# Patient Record
Sex: MALE | Race: WHITE | NOT HISPANIC OR LATINO
[De-identification: names, ages, dates, MRNs, and addresses within clinical notes are randomized per-mention and may not be internally consistent; named-entity substitution may affect disease eponyms.]

---

## 2018-06-25 PROBLEM — Z00.00 ENCOUNTER FOR PREVENTIVE HEALTH EXAMINATION: Status: ACTIVE | Noted: 2018-06-25

## 2018-07-25 ENCOUNTER — APPOINTMENT (OUTPATIENT)
Dept: OTOLARYNGOLOGY | Facility: CLINIC | Age: 23
End: 2018-07-25

## 2021-02-16 ENCOUNTER — OUTPATIENT (OUTPATIENT)
Dept: OUTPATIENT SERVICES | Facility: HOSPITAL | Age: 26
LOS: 1 days | End: 2021-02-16
Payer: COMMERCIAL

## 2021-02-16 VITALS
WEIGHT: 171.96 LBS | HEIGHT: 74 IN | OXYGEN SATURATION: 97 % | SYSTOLIC BLOOD PRESSURE: 125 MMHG | RESPIRATION RATE: 18 BRPM | TEMPERATURE: 97 F | HEART RATE: 81 BPM | DIASTOLIC BLOOD PRESSURE: 68 MMHG

## 2021-02-16 DIAGNOSIS — Z98.890 OTHER SPECIFIED POSTPROCEDURAL STATES: Chronic | ICD-10-CM

## 2021-02-16 DIAGNOSIS — J32.0 CHRONIC MAXILLARY SINUSITIS: ICD-10-CM

## 2021-02-16 DIAGNOSIS — J34.3 HYPERTROPHY OF NASAL TURBINATES: ICD-10-CM

## 2021-02-16 DIAGNOSIS — J34.2 DEVIATED NASAL SEPTUM: ICD-10-CM

## 2021-02-16 DIAGNOSIS — Z01.818 ENCOUNTER FOR OTHER PREPROCEDURAL EXAMINATION: ICD-10-CM

## 2021-02-16 LAB
ANION GAP SERPL CALC-SCNC: 11 MMOL/L — SIGNIFICANT CHANGE UP (ref 5–17)
BUN SERPL-MCNC: 17 MG/DL — SIGNIFICANT CHANGE UP (ref 7–23)
CALCIUM SERPL-MCNC: 9.6 MG/DL — SIGNIFICANT CHANGE UP (ref 8.4–10.5)
CHLORIDE SERPL-SCNC: 102 MMOL/L — SIGNIFICANT CHANGE UP (ref 96–108)
CO2 SERPL-SCNC: 25 MMOL/L — SIGNIFICANT CHANGE UP (ref 22–31)
CREAT SERPL-MCNC: 1.05 MG/DL — SIGNIFICANT CHANGE UP (ref 0.5–1.3)
GLUCOSE SERPL-MCNC: 89 MG/DL — SIGNIFICANT CHANGE UP (ref 70–99)
HCT VFR BLD CALC: 44.7 % — SIGNIFICANT CHANGE UP (ref 39–50)
HGB BLD-MCNC: 14.9 G/DL — SIGNIFICANT CHANGE UP (ref 13–17)
MCHC RBC-ENTMCNC: 29.1 PG — SIGNIFICANT CHANGE UP (ref 27–34)
MCHC RBC-ENTMCNC: 33.3 GM/DL — SIGNIFICANT CHANGE UP (ref 32–36)
MCV RBC AUTO: 87.3 FL — SIGNIFICANT CHANGE UP (ref 80–100)
NRBC # BLD: 0 /100 WBCS — SIGNIFICANT CHANGE UP (ref 0–0)
PLATELET # BLD AUTO: 204 K/UL — SIGNIFICANT CHANGE UP (ref 150–400)
POTASSIUM SERPL-MCNC: 4.1 MMOL/L — SIGNIFICANT CHANGE UP (ref 3.5–5.3)
POTASSIUM SERPL-SCNC: 4.1 MMOL/L — SIGNIFICANT CHANGE UP (ref 3.5–5.3)
RBC # BLD: 5.12 M/UL — SIGNIFICANT CHANGE UP (ref 4.2–5.8)
RBC # FLD: 11.8 % — SIGNIFICANT CHANGE UP (ref 10.3–14.5)
SODIUM SERPL-SCNC: 138 MMOL/L — SIGNIFICANT CHANGE UP (ref 135–145)
WBC # BLD: 8.26 K/UL — SIGNIFICANT CHANGE UP (ref 3.8–10.5)
WBC # FLD AUTO: 8.26 K/UL — SIGNIFICANT CHANGE UP (ref 3.8–10.5)

## 2021-02-16 PROCEDURE — G0463: CPT

## 2021-02-16 PROCEDURE — 85027 COMPLETE CBC AUTOMATED: CPT

## 2021-02-16 PROCEDURE — 80048 BASIC METABOLIC PNL TOTAL CA: CPT

## 2021-02-16 RX ORDER — SODIUM CHLORIDE 9 MG/ML
3 INJECTION INTRAMUSCULAR; INTRAVENOUS; SUBCUTANEOUS EVERY 8 HOURS
Refills: 0 | Status: DISCONTINUED | OUTPATIENT
Start: 2021-03-02 | End: 2021-03-16

## 2021-02-16 RX ORDER — FLUTICASONE PROPIONATE 220 MCG
1 AEROSOL WITH ADAPTER (GRAM) INHALATION
Qty: 0 | Refills: 0 | DISCHARGE

## 2021-02-16 RX ORDER — LIDOCAINE HCL 20 MG/ML
0.2 VIAL (ML) INJECTION ONCE
Refills: 0 | Status: DISCONTINUED | OUTPATIENT
Start: 2021-03-02 | End: 2021-03-16

## 2021-02-16 NOTE — H&P PST ADULT - HISTORY OF PRESENT ILLNESS
25 year old male with no significant PMH with longstanding Chronic Sinusitis. He endorses that he is currently on Cefdinir for the past 3 weeks and was instructed to continue with antibiotic through to surgery.  he presents to PAT today for screening prior to Nasal/Sinus Endoscopy W/Maxillary Antrostomy, Bilateral Submucous Resection Inferior Turbinates, Septoplasty on 3/2/2021.    COVID-19 pre-op screening on 2/28 at [facility]

## 2021-02-16 NOTE — H&P PST ADULT - NSANTHOSAYNRD_GEN_A_CORE
No. KAR screening performed.  STOP BANG Legend: 0-2 = LOW Risk; 3-4 = INTERMEDIATE Risk; 5-8 = HIGH Risk

## 2021-02-16 NOTE — H&P PST ADULT - NSICDXPROBLEM_GEN_ALL_CORE_FT
PROBLEM DIAGNOSES  Problem: Chronic maxillary sinusitis  Assessment and Plan: Nasal/Sinus Endoscopy w/maxillary Antrostomy  Bilateral submucous Resection inferior Turbinates  Septoplasty

## 2021-02-23 ENCOUNTER — APPOINTMENT (OUTPATIENT)
Dept: CT IMAGING | Facility: IMAGING CENTER | Age: 26
End: 2021-02-23

## 2021-02-23 ENCOUNTER — OUTPATIENT (OUTPATIENT)
Dept: OUTPATIENT SERVICES | Facility: HOSPITAL | Age: 26
LOS: 1 days | End: 2021-02-23
Payer: COMMERCIAL

## 2021-02-23 DIAGNOSIS — J34.2 DEVIATED NASAL SEPTUM: ICD-10-CM

## 2021-02-23 DIAGNOSIS — Z98.890 OTHER SPECIFIED POSTPROCEDURAL STATES: Chronic | ICD-10-CM

## 2021-02-23 PROBLEM — J45.909 UNSPECIFIED ASTHMA, UNCOMPLICATED: Chronic | Status: ACTIVE | Noted: 2021-02-16

## 2021-02-23 PROBLEM — J32.9 CHRONIC SINUSITIS, UNSPECIFIED: Chronic | Status: ACTIVE | Noted: 2021-02-16

## 2021-02-23 PROCEDURE — 70486 CT MAXILLOFACIAL W/O DYE: CPT

## 2021-02-23 PROCEDURE — 70486 CT MAXILLOFACIAL W/O DYE: CPT | Mod: 26

## 2021-02-27 ENCOUNTER — OUTPATIENT (OUTPATIENT)
Dept: OUTPATIENT SERVICES | Facility: HOSPITAL | Age: 26
LOS: 1 days | End: 2021-02-27
Payer: COMMERCIAL

## 2021-02-27 DIAGNOSIS — Z98.890 OTHER SPECIFIED POSTPROCEDURAL STATES: Chronic | ICD-10-CM

## 2021-02-27 DIAGNOSIS — Z11.52 ENCOUNTER FOR SCREENING FOR COVID-19: ICD-10-CM

## 2021-02-27 LAB — SARS-COV-2 RNA SPEC QL NAA+PROBE: SIGNIFICANT CHANGE UP

## 2021-02-27 PROCEDURE — C9803: CPT

## 2021-02-27 PROCEDURE — U0003: CPT

## 2021-02-27 PROCEDURE — U0005: CPT

## 2021-03-01 ENCOUNTER — TRANSCRIPTION ENCOUNTER (OUTPATIENT)
Age: 26
End: 2021-03-01

## 2021-03-02 ENCOUNTER — OUTPATIENT (OUTPATIENT)
Dept: OUTPATIENT SERVICES | Facility: HOSPITAL | Age: 26
LOS: 1 days | End: 2021-03-02
Payer: COMMERCIAL

## 2021-03-02 ENCOUNTER — RESULT REVIEW (OUTPATIENT)
Age: 26
End: 2021-03-02

## 2021-03-02 VITALS
DIASTOLIC BLOOD PRESSURE: 82 MMHG | OXYGEN SATURATION: 100 % | RESPIRATION RATE: 16 BRPM | HEART RATE: 67 BPM | WEIGHT: 171.96 LBS | TEMPERATURE: 98 F | SYSTOLIC BLOOD PRESSURE: 137 MMHG | HEIGHT: 74 IN

## 2021-03-02 VITALS
SYSTOLIC BLOOD PRESSURE: 98 MMHG | RESPIRATION RATE: 14 BRPM | HEART RATE: 73 BPM | DIASTOLIC BLOOD PRESSURE: 51 MMHG | TEMPERATURE: 98 F | OXYGEN SATURATION: 96 %

## 2021-03-02 DIAGNOSIS — J34.3 HYPERTROPHY OF NASAL TURBINATES: ICD-10-CM

## 2021-03-02 DIAGNOSIS — J32.0 CHRONIC MAXILLARY SINUSITIS: ICD-10-CM

## 2021-03-02 DIAGNOSIS — J34.2 DEVIATED NASAL SEPTUM: ICD-10-CM

## 2021-03-02 DIAGNOSIS — Z98.890 OTHER SPECIFIED POSTPROCEDURAL STATES: Chronic | ICD-10-CM

## 2021-03-02 PROCEDURE — 30140 RESECT INFERIOR TURBINATE: CPT | Mod: 50

## 2021-03-02 PROCEDURE — 88300 SURGICAL PATH GROSS: CPT

## 2021-03-02 PROCEDURE — 88300 SURGICAL PATH GROSS: CPT | Mod: 26

## 2021-03-02 PROCEDURE — 30520 REPAIR OF NASAL SEPTUM: CPT

## 2021-03-02 PROCEDURE — C9399: CPT

## 2021-03-02 RX ORDER — FLUTICASONE PROPIONATE 50 MCG
0 SPRAY, SUSPENSION NASAL
Qty: 0 | Refills: 0 | DISCHARGE

## 2021-03-02 RX ORDER — CEFDINIR 250 MG/5ML
1 POWDER, FOR SUSPENSION ORAL
Qty: 0 | Refills: 0 | DISCHARGE

## 2021-03-02 NOTE — ASU DISCHARGE PLAN (ADULT/PEDIATRIC) - ASU DC SPECIAL INSTRUCTIONSFT
Head of bed 2 pillows or 30 degrees up  Change drip pad as soon as you get home  Oxymetazoline and saline sprays   See me in my office tmr  Advance diet as tolerated

## 2021-03-05 LAB — SURGICAL PATHOLOGY STUDY: SIGNIFICANT CHANGE UP

## 2021-08-23 ENCOUNTER — APPOINTMENT (OUTPATIENT)
Dept: OPHTHALMOLOGY | Facility: CLINIC | Age: 26
End: 2021-08-23

## 2022-02-10 ENCOUNTER — APPOINTMENT (OUTPATIENT)
Dept: ORTHOPEDIC SURGERY | Facility: CLINIC | Age: 27
End: 2022-02-10
Payer: COMMERCIAL

## 2022-02-10 VITALS
BODY MASS INDEX: 24.13 KG/M2 | SYSTOLIC BLOOD PRESSURE: 113 MMHG | HEART RATE: 93 BPM | WEIGHT: 188 LBS | DIASTOLIC BLOOD PRESSURE: 71 MMHG | HEIGHT: 74 IN

## 2022-02-10 DIAGNOSIS — M94.0 CHONDROCOSTAL JUNCTION SYNDROME [TIETZE]: ICD-10-CM

## 2022-02-10 PROCEDURE — 99203 OFFICE O/P NEW LOW 30 MIN: CPT

## 2022-02-14 PROBLEM — M94.0 COSTOCHONDRITIS: Status: ACTIVE | Noted: 2022-02-14

## 2022-02-14 NOTE — HISTORY OF PRESENT ILLNESS
[de-identified] : RHD Patient is here for bilateral shoulder popping that began about 2 weeks ago. 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 He was doing an exercise and he felt his left shoulder drop. He states that his parents are both doctors and they said that it was his AC joint. He feels a popping sensation when doing exercises but not pain. He is having pain in his sternum when lifting his arms above his head and coughing.  He does not lift heavy during work. He lifts weights 5-6 times a week. He does some cardio 1-2 times. Denies N/T/R/Prior injury. \par \par The patient's past medical history, past surgical history, medications and allergies were reviewed by me today and documented accordingly. In addition, the patient's family and social history, which were noncontributory to this visit, were reviewed also. Intake form was reviewed. The patient has no family history of arthritis.

## 2022-02-14 NOTE — DISCUSSION/SUMMARY
[de-identified] : Discussed findings of today's exam and possible causes of patient's pain.  Educated patient on their most probable diagnosis of chest wall pain due to left > right costochondritis.  Reviewed possible courses of treatment, and we collaboratively decided best course of treatment at this time will include conservative management.  Patient has noted mild step-off deformities at his bilateral AC joints, but there does not appear to be any acute sprain or injury at these locations, this is likely pre-existing.  Patient is advised that the chest wall discomfort that he is having is due to costochondritis, inflammation at the costochondral junctions can often lead to some intermittent cracking/popping.  Patient is advised this issue should resolve on its own with appropriate activity modification regarding his weightlifting activities.  We discussed avoidance of heavy weightlifting and/or high impact exercise on the chest wall for the next 2-4 weeks.  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  Patient is advised if he had a notable injury at the costochondral junction it could potentially be popping/cracking on a permanent basis, but this is unlikely to cause him any pain or dysfunction.  Patient is advised that there is no indications for any surgical intervention, no need for any orthopedic consultation or advanced imaging at this time as patient appears to have a mild inflammatory issue that will not require any surgical intervention.  Follow up as needed.  Patient appreciates and agrees with current plan.\par \par I work as part of an academic orthopedic group and routinely have a physician in training (resident / fellow) working with me.  Any part of the history and physical exam performed by the physician in training was either directly reviewed and/or replicated by myself.  Any procedure performed by the physician in training was performed under my direct supervision and with the consent of the patient.\par \par This note was generated using dragon medical dictation software.  A reasonable effort has been made for proofreading its contents, but typos may still remain.  If there are any questions or points of clarification needed please notify my office.\par

## 2022-02-14 NOTE — PHYSICAL EXAM
[de-identified] : Constitutional: Well-nourished, well-developed, No acute distress\par Respiratory:  Good respiratory effort, no SOB\par Lymphatic: No regional lymphadenopathy, no lymphedema\par Psychiatric: Pleasant and normal affect, alert and oriented x3\par Musculoskeletal: normal except where as noted in regional exam\par \par Rib cage:\par \par POSITIVE TENDERNESS: + TTP of the bilateral 2–6 costochondral junctions, no bony rib tenderness\par \par NONTENDER:  nontender bilateral ribs 1-12 along the anterior, body and posterior portions b/l, no bony tenderness of the thoracic spine, no facet tenderness, no tenderness of xiphoid, sternum or manubrium, no clavicular, SC or AC joint tenderness b/l.  no tenderness of the diaphragm with deep palpation\par \par ROM:  Slight decreased excursion of the rib cage on deep breathing secondary to pain\par \par Thoracic Spine Exam:  normal curvature and normal alignment. good posture. no midline bony tenderness, no marked spasm. no marked tenderness in paraspinal muscles.  ROM full & painless all planes\par \par Bilateral shoulders:\par APPEARANCE: Mild step-off seen at AC joints bilaterally, no marked deformities, no swelling or malalignment\par POSITIVE TENDERNESS: No tenderness of AC joints\par NONTENDER: supraspinatus, infraspinatus, teres minor, LH biceps, anterior and posterior capsule, AC joint \par ROM: full & painless, no scapular winging or dyskinesia present\par RESISTIVE TESTING: painless 5/5 resisted flex/ext, empty can/ER/IR, horizontal abd/add \par \par + Hypermobility: Beighton score 9 out of 9

## 2022-06-23 ENCOUNTER — APPOINTMENT (OUTPATIENT)
Dept: ORTHOPEDIC SURGERY | Facility: CLINIC | Age: 27
End: 2022-06-23
Payer: COMMERCIAL

## 2022-06-23 VITALS — WEIGHT: 188 LBS | BODY MASS INDEX: 24.13 KG/M2 | HEIGHT: 74 IN

## 2022-06-23 PROCEDURE — 99203 OFFICE O/P NEW LOW 30 MIN: CPT

## 2022-06-23 PROCEDURE — 73030 X-RAY EXAM OF SHOULDER: CPT | Mod: LT

## 2022-06-23 NOTE — DISCUSSION/SUMMARY
[de-identified] : 25 y/o male with left pectoralis strain\par \par Patient presents for evaluation of left chest discomfort.  Patient's symptoms appear to be related to a small indentation to the myotendinous junction of the left pectoralis musculature.  This appears to be at the myotendinous junction of the sternal head of the pectoralis muscle.  Patient also has some discomfort over the pectoralis tendon without evidence of acute or chronic injury. MRI Rx given to patient to further delineate internal structural derangement to the chest/pectoralis musculature. This will allow for better understanding of the severity of disease, and allow for better stratification of treatment strategies. Patient is agreeable to further imaging. \par \par Recommendations: Activity to tolerance.  OTC NSAID's or acetaminophen as tolerated, with application of ice to the area 2-3x daily for 20 minutes after periods of activity. \par \par Followup: After MRI

## 2022-06-23 NOTE — PHYSICAL EXAM
[de-identified] : Oriented to time, place, person\par Mood: Normal\par Affect: Normal\par Appearance: Healthy, well appearing, no acute distress.\par Gait: Normal\par Assistive Devices: None\par \par Left shoulder exam:\par \par Inspection: No malalignment, mild indentation of the pectoralis MT junction. No atrophy\par Skin: No masses, No lesions\par Neck: Negative Spurling, full ROM, no pain with ROM\par AROM: FF to 180, abduction to 90, ER to 90, IR to upper lumbar\par Painful arc ROM: none\par Tenderness: +Pectoralis tendon ttp. +pain over the myotendinous junction sternal head of pectoralis. No bicipital tenderness, no tenderness to greater tuberosity/RTC insertion, no anterior shoulder/lesser tuberosity tenderness\par Strength: 5/5 ER, 5/5 IR in adduction, 5/5 supraspinatus testing, negative [place]'s test\par AC joint: No TTP/pain with cross arm testing\par Biceps: Speed Negative, Yergason Negative \par Impingement test: Negative Culp, Negative Neer\par Vasc: 2+ radial pulse \par Stability: Stable \par Neuro: AIN, PIN, Ulnar nerve intact to motor\par Sensation: Intact to light touch throughout  [de-identified] : The following radiographs were ordered and read by me during this patients visit. I reviewed each radiograph in detail with the patient and discussed the findings as highlighted below.\par \par 3 views of left shoulder were obtained today, 06/23/2022, that show no acute fracture or dislocation. There is no glenohumeral and no AC joint degenerative change seen. Type II acromion. There is no significant malalignment. No significant other obvious osseous abnormality, otherwise unremarkable.

## 2022-06-23 NOTE — ADDENDUM
[FreeTextEntry1] : This note was written by Consuelo Barber on 06/23/2022 acting solely as a scribe for Dr. Dionte Hernandez.\par \par All medical record entries made by the Scribe were at my, Dr. Dionte Hernandez, direction and personally dictated by me on 06/23/2022. I have personally reviewed the chart and agree that the record accurately reflects my personal performance of the history, physical exam, assessment and plan.

## 2022-06-23 NOTE — HISTORY OF PRESENT ILLNESS
[de-identified] : 26 year old RHD male presents today with right shoulder pain since January 2022. States that he was doing weight training in the winter and thinks that he he pushed him harder in January when the pain started. Localizes pain mainly to the chest/Pec muscle with radiation into the shoulder. He was seen by Dr. Owens in February was told to avoid certain chest exercises. He has avoided those exercises but pain persists. C/O weakness in the left pec region with weight lifting. He is not taking pain medication. Also complaining about clicking in the shoulder. Patient is very active and likes to do heavy lifting. \par \par The patient's past medical history, past surgical history, medications and allergies were reviewed by me today with the patient and documented accordingly. In addition, the patient's family and social history, which were noncontributory to this visit, were reviewed also.

## 2022-07-05 ENCOUNTER — APPOINTMENT (OUTPATIENT)
Dept: MRI IMAGING | Facility: IMAGING CENTER | Age: 27
End: 2022-07-05

## 2022-07-05 ENCOUNTER — RESULT REVIEW (OUTPATIENT)
Age: 27
End: 2022-07-05

## 2022-07-05 ENCOUNTER — OUTPATIENT (OUTPATIENT)
Dept: OUTPATIENT SERVICES | Facility: HOSPITAL | Age: 27
LOS: 1 days | End: 2022-07-05
Payer: COMMERCIAL

## 2022-07-05 DIAGNOSIS — Z98.890 OTHER SPECIFIED POSTPROCEDURAL STATES: Chronic | ICD-10-CM

## 2022-07-05 DIAGNOSIS — Z00.8 ENCOUNTER FOR OTHER GENERAL EXAMINATION: ICD-10-CM

## 2022-07-05 PROCEDURE — 71550 MRI CHEST W/O DYE: CPT | Mod: 26

## 2022-07-05 PROCEDURE — 71550 MRI CHEST W/O DYE: CPT

## 2022-07-19 ENCOUNTER — NON-APPOINTMENT (OUTPATIENT)
Age: 27
End: 2022-07-19

## 2022-07-27 ENCOUNTER — APPOINTMENT (OUTPATIENT)
Dept: ORTHOPEDIC SURGERY | Facility: CLINIC | Age: 27
End: 2022-07-27

## 2022-07-27 DIAGNOSIS — S29.011D STRAIN OF MUSCLE AND TENDON OF FRONT WALL OF THORAX, SUBSEQUENT ENCOUNTER: ICD-10-CM

## 2022-07-27 PROCEDURE — 99214 OFFICE O/P EST MOD 30 MIN: CPT

## 2022-07-28 PROBLEM — S29.011D PECTORALIS MUSCLE STRAIN, SUBSEQUENT ENCOUNTER: Status: ACTIVE | Noted: 2022-06-23

## 2022-07-28 NOTE — ADDENDUM
[FreeTextEntry1] : This note was written by Consuelo Barber on 07/27/2022 acting solely as a scribe for Dr. Dionte Hernandez.\par \par All medical record entries made by the Scribe were at my, Dr. Dionte Hernandez, direction and personally dictated by me on 07/27/2022. I have personally reviewed the chart and agree that the record accurately reflects my personal performance of the history, physical exam, assessment and plan.

## 2022-07-28 NOTE — PHYSICAL EXAM
[de-identified] : Oriented to time, place, person\par Mood: Normal\par Affect: Normal\par Appearance: Healthy, well appearing, no acute distress.\par Gait: Normal\par Assistive Devices: None\par \par Left shoulder exam:\par \par Inspection: No malalignment, mild indentation of the pectoralis MT junction. No atrophy\par Skin: No masses, No lesions\par Neck: Negative Spurling, full ROM, no pain with ROM\par AROM: FF to 180, abduction to 90, ER to 90, IR to upper lumbar\par Painful arc ROM: none\par Tenderness: +Pectoralis tendon ttp. +pain over the myotendinous junction sternal head of pectoralis. No bicipital tenderness, no tenderness to greater tuberosity/RTC insertion, no anterior shoulder/lesser tuberosity tenderness\par Strength: 5/5 ER, 5/5 IR in adduction, 5/5 supraspinatus testing, negative [place]'s test\par AC joint: No TTP/pain with cross arm testing\par Biceps: Speed Negative, Yergason Negative \par Impingement test: Negative Culp, Negative Neer\par Vasc: 2+ radial pulse \par Stability: Stable \par Neuro: AIN, PIN, Ulnar nerve intact to motor\par Sensation: Intact to light touch throughout  [de-identified] : 3 views of left shoulder were obtained 06/23/2022, that show no acute fracture or dislocation. There is no glenohumeral and no AC joint degenerative change seen. Type II acromion. There is no significant malalignment. No significant other obvious osseous abnormality, otherwise unremarkable. \par \par MRI left chest dated 7/5/2022 shows no evidence of left pectoralis strain or myotendinous injury.

## 2022-07-28 NOTE — DISCUSSION/SUMMARY
[de-identified] : 27 y/o male with left pectoralis strain\par \par Patient continues to follow-up for left chest discomfort consistent with prior strain.  MRI imaging shows no significant internal derangement, and we discussed conservative management at this time for his residual complaints.  No evidence of gross pectoralis tendon injury, nor does there appear to be any significant myotendinous injury at the level of discomfort.  We discussed further treatment that would include conditioning program, and return to activities as tolerated.  Patient voiced understanding and appreciation.\par \par Recommendations: Return to activity as tolerated. NSAIDs/Ice/heat as needed. \par \par Follow-up as needed.

## 2022-07-28 NOTE — HISTORY OF PRESENT ILLNESS
[de-identified] : 26 year old RHD male presents today for follow up of right chest discomfort since January 2022. He has obtained MRI and is here for review of results. MRI left chest dated 7/5/2022 shows no evidence of left pectoralis strain or myotendinous injury. Symptoms have remained stable since last visit. Rates his pain 1/10. C/O weakness in the left pec region with weight lifting. He is not taking pain medication. Also complaining about clicking in the shoulder.

## 2022-12-29 ENCOUNTER — TRANSCRIPTION ENCOUNTER (OUTPATIENT)
Age: 27
End: 2022-12-29

## 2023-10-23 ENCOUNTER — APPOINTMENT (OUTPATIENT)
Dept: NEUROLOGY | Facility: CLINIC | Age: 28
End: 2023-10-23